# Patient Record
Sex: FEMALE | NOT HISPANIC OR LATINO
[De-identification: names, ages, dates, MRNs, and addresses within clinical notes are randomized per-mention and may not be internally consistent; named-entity substitution may affect disease eponyms.]

---
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
## 2024-07-30 ENCOUNTER — HOSPITAL ENCOUNTER (EMERGENCY)
Facility: HOSPITAL | Age: 19
Discharge: HOME | End: 2024-07-30
Attending: PEDIATRICS
Payer: COMMERCIAL

## 2024-07-30 VITALS
OXYGEN SATURATION: 100 % | HEART RATE: 78 BPM | RESPIRATION RATE: 16 BRPM | TEMPERATURE: 97 F | WEIGHT: 126.75 LBS | DIASTOLIC BLOOD PRESSURE: 80 MMHG | SYSTOLIC BLOOD PRESSURE: 129 MMHG

## 2024-07-30 DIAGNOSIS — R10.9 ABDOMINAL PAIN, ACUTE: ICD-10-CM

## 2024-07-30 DIAGNOSIS — R19.7 DIARRHEA OF PRESUMED INFECTIOUS ORIGIN: Primary | ICD-10-CM

## 2024-07-30 LAB
C DIFF GDH + TOXINS A+B STL QL IA.RAPID: DETECTED
C DIFF TOX B STL QL: POSITIVE

## 2024-07-30 PROCEDURE — 99283 EMERGENCY DEPT VISIT LOW MDM: CPT

## 2024-07-30 PROCEDURE — 87493 C DIFF AMPLIFIED PROBE: CPT | Performed by: PEDIATRICS

## 2024-07-30 PROCEDURE — 99284 EMERGENCY DEPT VISIT MOD MDM: CPT

## 2024-07-30 RX ORDER — VANCOMYCIN HYDROCHLORIDE 125 MG/1
125 CAPSULE ORAL 4 TIMES DAILY
Qty: 40 CAPSULE | Refills: 0 | Status: SHIPPED | OUTPATIENT
Start: 2024-07-30 | End: 2024-08-09

## 2024-07-30 NOTE — ED PROVIDER NOTES
Patient Seen in: [facility] Emergency Department      History     Chief Complaint   Patient presents with    Nausea/Vomiting/Diarrhea     Stated Complaint: abd pain, diarrhea    Subjective:   HPI    Patient is a 18-year-old female complaining of diarrhea for the past almost 2 weeks.  She states she had a laceration about 2 weeks ago was prescribed antibiotics.  She took clindamycin for 2 days and started having some loose stools so she stopped.  She has continued to have loose stool and some crampy abdominal pain since then.  No dysuria no fevers.    Objective:   History reviewed. No pertinent past medical history.           History reviewed. No pertinent surgical history.             Social History     Socioeconomic History    Marital status: Single   Tobacco Use    Smoking status: Never    Smokeless tobacco: Never              Review of Systems    Positive for stated Chief Complaint: Nausea/Vomiting/Diarrhea    Other systems are as noted in HPI.  Constitutional and vital signs reviewed.      All other systems reviewed and negative except as noted above.    Physical Exam     ED Triage Vitals [07/30/24 1143]   /80   Pulse 83   Resp 18   Temp 97.3 °F (36.3 °C)   Temp src Temporal   SpO2 100 %   O2 Device None (Room air)       Current Vitals:   Vital Signs  BP: 129/80  Pulse: 78  Resp: 16  Temp: 97.3 °F (36.3 °C)  Temp src: Temporal    Oxygen Therapy  SpO2: 100 %  O2 Device: None (Room air)            Physical Exam    HEENT: The pupils are equal round and react to light, oropharynx is clear, mucous membranes are moist.  Ears:left TM shows no erythema, right TM shows no erythema   Neck: Supple, full range of motion.  CV: Chest is clear to auscultation, no wheezes rales or rhonchi.  Cardiac exam normal S1-S2, no murmurs rubs or gallops.  Abdomen: Soft, nontender, nondistended.  Bowel sounds present throughout.  Extremities: Warm and well perfused.  Dermatologic exam: No rashes or lesions.  Neurologic exam:  Cranial nerves 2-12 grossly intact.    Orthopedic exam: normal,from.    ED Course     Labs Reviewed   C. DIFFICILE(TOXIGENIC)PCR              Labs:  ^^ Personally ordered, reviewed, and interpreted all unique tests ordered.  Clinically significant labs noted: C. difficile stool collected.  Results pending    Medications administered:  Medications - No data to display    Pulse oximetry:  Pulse oximetry on room air is 100% and is normal.     Cardiac monitoring:  Initial heart rate is 83 and is normal for age    Vital signs:  Vitals:    07/30/24 1143 07/30/24 1251   BP: 129/80    Pulse: 83 78   Resp: 18 16   Temp: 97.3 °F (36.3 °C)    TempSrc: Temporal    SpO2: 100% 100%   Weight: 57.5 kg        Chart review:  ^^ Review of prior external notes from unique sources (non-[facility] ED records): noted in history previous visits for PCOS reviewed           MDM      Patient presents with loose stool for the past 2 weeks.  This could be an infectious viral enteritis versus C. difficile.  Belly exam benign.  We await the results of the C. difficile testing.  She will follow-up with the PMD as needed and return for worsening of symptoms    Patient was screened and evaluated during this visit.   As a treating physician attending to the patient, I determined, within reasonable clinical confidence and prior to discharge, that an emergency medical condition was not or was no longer present.  There was no indication for further evaluation, treatment or admission on an emergency basis.  Comprehensive verbal and written discharge and follow-up instructions were provided to help prevent relapse or worsening.  Patient was instructed to follow-up with the primary care provider for further evaluation and treatment, but to return immediately to the ER for worsening, concerning, new, changing or persisting symptoms.  I discussed the case with the patient/parent and they had no questions, complaints, or concerns.  Patient/parent felt comfortable  going home.                               Medical Decision Making      Disposition and Plan     Clinical Impression:  1. Diarrhea of presumed infectious origin    2. Abdominal pain, acute         Disposition:  Discharge  7/30/2024 12:23 pm    Follow-up:  No follow-up provider specified.        Medications Prescribed:  There are no discharge medications for this patient.

## 2024-07-30 NOTE — PROGRESS NOTES
ED Culture Callback Results Review    Pharmacist reviewed culture results from ED visit .    Final stool panel positive for untreated C diff.    A new prescription for vancomycin was electronically sent to [facility] as discussed with Dr. Valadez. The patient was contacted by phone, informed of the results, and educated regarding the new therapy. The patient verbalized understanding of the treatment plan and all questions were answered.    Arturo Rodríguez, Asael   Emergency Medicine Pharmacist Specialist  07/30/24; 6:18 PM

## 2024-07-30 NOTE — ED INITIAL ASSESSMENT (HPI)
Patient reports 2 weeks ago she was prescribed some clindamycin for a finger laceration. She took 2 doses and then began having diarrhea which has continued since. She stopped taking the antibiotic and the laceration then healed with no infection or complication.    She reports the diarrhea has a unique foul smell and contains some mucous but no blood. She also reports intermittent severe cramping abdominal pain along with difficulty eating due to the pain. She reports fatigue, hot flashes, and some headaches as well.     She reports hx of PCOS but no ongoing treatment for that. No other pmhx.